# Patient Record
Sex: MALE | Race: WHITE | HISPANIC OR LATINO | Employment: FULL TIME | URBAN - METROPOLITAN AREA
[De-identification: names, ages, dates, MRNs, and addresses within clinical notes are randomized per-mention and may not be internally consistent; named-entity substitution may affect disease eponyms.]

---

## 2018-12-14 ENCOUNTER — TELEPHONE (OUTPATIENT)
Dept: URGENT CARE | Facility: CLINIC | Age: 33
End: 2018-12-14

## 2018-12-14 NOTE — TELEPHONE ENCOUNTER
Charted that I was giving Pt a Flu shot and hit given before calling them back to treatment room. When Pt came back he was ill with a cold and Dr. Lloyd stated it was not a good idea to do so. At this stage I couldn't delete or decline giving the shot. I contacted [facility] and I'm waiting on them to try, so that if Pt goes somewhere else he wont be blocked by his insurance.

## 2022-12-01 ENCOUNTER — OFFICE VISIT (OUTPATIENT)
Dept: FAMILY MEDICINE CLINIC | Facility: CLINIC | Age: 37
End: 2022-12-01

## 2022-12-01 VITALS
TEMPERATURE: 98 F | SYSTOLIC BLOOD PRESSURE: 138 MMHG | OXYGEN SATURATION: 98 % | WEIGHT: 185.5 LBS | DIASTOLIC BLOOD PRESSURE: 82 MMHG | HEIGHT: 63 IN | RESPIRATION RATE: 15 BRPM | HEART RATE: 76 BPM | BODY MASS INDEX: 32.87 KG/M2

## 2022-12-01 DIAGNOSIS — Z13.220 SCREENING FOR HYPERLIPIDEMIA: ICD-10-CM

## 2022-12-01 DIAGNOSIS — Z13.1 SCREENING FOR DIABETES MELLITUS: ICD-10-CM

## 2022-12-01 DIAGNOSIS — Z00.00 GENERAL MEDICAL EXAM: Primary | ICD-10-CM

## 2022-12-01 DIAGNOSIS — Z11.59 NEED FOR HEPATITIS C SCREENING TEST: ICD-10-CM

## 2022-12-01 PROCEDURE — 99385 PREV VISIT NEW AGE 18-39: CPT | Performed by: PHYSICIAN ASSISTANT

## 2022-12-01 PROCEDURE — 36415 COLL VENOUS BLD VENIPUNCTURE: CPT | Performed by: PHYSICIAN ASSISTANT

## 2022-12-01 NOTE — PATIENT INSTRUCTIONS
"Healthy Eating  Following a healthy eating pattern may help you to achieve and maintain a healthy body weight, reduce the risk of chronic disease, and live a long and productive life. It is important to follow a healthy eating pattern at an appropriate calorie level for your body. Your nutritional needs should be met primarily through food by choosing a variety of nutrient-rich foods.  What are tips for following this plan?  Reading food labels  Read labels and choose the following:  Reduced or low sodium.  Juices with 100% fruit juice.  Foods with low saturated fats and high polyunsaturated and monounsaturated fats.  Foods with whole grains, such as whole wheat, cracked wheat, brown rice, and wild rice.  Whole grains that are fortified with folic acid. This is recommended for women who are pregnant or who want to become pregnant.  Read labels and avoid the following:  Foods with a lot of added sugars. These include foods that contain brown sugar, corn sweetener, corn syrup, dextrose, fructose, glucose, high-fructose corn syrup, honey, invert sugar, lactose, malt syrup, maltose, molasses, raw sugar, sucrose, trehalose, or turbinado sugar.  Do not eat more than the following amounts of added sugar per day:  6 teaspoons (25 g) for women.  9 teaspoons (38 g) for men.  Foods that contain processed or refined starches and grains.  Refined grain products, such as white flour, degermed cornmeal, white bread, and white rice.  Shopping  Choose nutrient-rich snacks, such as vegetables, whole fruits, and nuts. Avoid high-calorie and high-sugar snacks, such as potato chips, fruit snacks, and candy.  Use oil-based dressings and spreads on foods instead of solid fats such as butter, stick margarine, or cream cheese.  Limit pre-made sauces, mixes, and \"instant\" products such as flavored rice, instant noodles, and ready-made pasta.  Try more plant-protein sources, such as tofu, tempeh, black beans, edamame, lentils, nuts, and " seeds.  Explore eating plans such as the Mediterranean diet or vegetarian diet.  Cooking  Use oil to sauté or stir-laureano foods instead of solid fats such as butter, stick margarine, or lard.  Try baking, boiling, grilling, or broiling instead of frying.  Remove the fatty part of meats before cooking.  Steam vegetables in water or broth.  Meal planning    At meals, imagine dividing your plate into fourths:  One-half of your plate is fruits and vegetables.  One-fourth of your plate is whole grains.  One-fourth of your plate is protein, especially lean meats, poultry, eggs, tofu, beans, or nuts.  Include low-fat dairy as part of your daily diet.     Lifestyle  Choose healthy options in all settings, including home, work, school, restaurants, or stores.  Prepare your food safely:  Wash your hands after handling raw meats.  Keep food preparation surfaces clean by regularly washing with hot, soapy water.  Keep raw meats separate from ready-to-eat foods, such as fruits and vegetables.  , meat, poultry, and eggs to the recommended internal temperature.  Store foods at safe temperatures. In general:  Keep cold foods at 40°F (4.4°C) or below.  Keep hot foods at 140°F (60°C) or above.  Keep your freezer at 0°F (-17.8°C) or below.  Foods are no longer safe to eat when they have been between the temperatures of 40°-140°F (4.4-60°C) for more than 2 hours.  What foods should I eat?  Fruits  Aim to eat 2 cup-equivalents of fresh, canned (in natural juice), or frozen fruits each day. Examples of 1 cup-equivalent of fruit include 1 small apple, 8 large strawberries, 1 cup canned fruit, ½ cup dried fruit, or 1 cup 100% juice.  Vegetables  Aim to eat 2½-3 cup-equivalents of fresh and frozen vegetables each day, including different varieties and colors. Examples of 1 cup-equivalent of vegetables include 2 medium carrots, 2 cups raw, leafy greens, 1 cup chopped vegetable (raw or cooked), or 1 medium baked potato.  Grains  Aim  to eat 6 ounce-equivalents of whole grains each day. Examples of 1 ounce-equivalent of grains include 1 slice of bread, 1 cup ready-to-eat cereal, 3 cups popcorn, or ½ cup cooked rice, pasta, or cereal.  Meats and other proteins  Aim to eat 5-6 ounce-equivalents of protein each day. Examples of 1 ounce-equivalent of protein include 1 egg, 1/2 cup nuts or seeds, or 1 tablespoon (16 g) peanut butter. A cut of meat or fish that is the size of a deck of cards is about 3-4 ounce-equivalents.  Of the protein you eat each week, try to have at least 8 ounces come from seafood. This includes salmon, trout, herring, and anchovies.  Dairy  Aim to eat 3 cup-equivalents of fat-free or low-fat dairy each day. Examples of 1 cup-equivalent of dairy include 1 cup (240 mL) milk, 8 ounces (250 g) yogurt, 1½ ounces (44 g) natural cheese, or 1 cup (240 mL) fortified soy milk.  Fats and oils  Aim for about 5 teaspoons (21 g) per day. Choose monounsaturated fats, such as canola and olive oils, avocados, peanut butter, and most nuts, or polyunsaturated fats, such as sunflower, corn, and soybean oils, walnuts, pine nuts, sesame seeds, sunflower seeds, and flaxseed.  Beverages  Aim for six 8-oz glasses of water per day. Limit coffee to three to five 8-oz cups per day.  Limit caffeinated beverages that have added calories, such as soda and energy drinks.  Limit alcohol intake to no more than 1 drink a day for nonpregnant women and 2 drinks a day for men. One drink equals 12 oz of beer (355 mL), 5 oz of wine (148 mL), or 1½ oz of hard liquor (44 mL).  Seasoning and other foods  Avoid adding excess amounts of salt to your foods. Try flavoring foods with herbs and spices instead of salt.  Avoid adding sugar to foods.  Try using oil-based dressings, sauces, and spreads instead of solid fats.  This information is based on general [place] nutrition guidelines. For more information, visit choosemyplate.gov. Exact amounts may vary based on your  nutrition needs.  Summary  A healthy eating plan may help you to maintain a healthy weight, reduce the risk of chronic diseases, and stay active throughout your life.  Plan your meals. Make sure you eat the right portions of a variety of nutrient-rich foods.  Try baking, boiling, grilling, or broiling instead of frying.  Choose healthy options in all settings, including home, work, school, restaurants, or stores.  This information is not intended to replace advice given to you by your health care provider. Make sure you discuss any questions you have with your health care provider.  Document Revised: 04/01/2019 Document Reviewed: 04/01/2019  Elsevier Patient Education © 2021 [employer] Inc.

## 2022-12-01 NOTE — PROGRESS NOTES
Annual Physical-Preventive Visit     Patient Name: Oswaldo Peralta  : 1985   MRN: [id number]     Chief Complaint:    Chief Complaint   Patient presents with   • Establish Care     Patient is here for a physical and labs        History of Present Illness: Oswaldo Peralta is a 36 y.o. male who is here today for their annual health maintenance and physical.  He has no complaints today.    Subjective      Review of Systems:   Review of Systems   Constitutional: Negative for fatigue and fever.   HENT: Negative for hearing loss.    Eyes: Negative for visual disturbance.   Respiratory: Negative for shortness of breath.    Cardiovascular: Negative for chest pain, palpitations and leg swelling.   Gastrointestinal: Negative for abdominal pain, blood in stool, constipation and diarrhea.   Genitourinary: Negative for difficulty urinating.   Musculoskeletal: Negative for arthralgias and myalgias.   Skin: Negative for rash.   Allergic/Immunologic: Negative for immunocompromised state.   Psychiatric/Behavioral: Negative for dysphoric mood. The patient is not nervous/anxious.         Past History:  Medical History: has no past medical history on file.   Surgical History: has no past surgical history on file.   Family History: family history is not on file.   Social History: reports that he has never smoked. He has never used smokeless tobacco. He reports that he does not drink alcohol and does not use drugs.    Health Maintenance   Topic Date Due   • ANNUAL PHYSICAL  Never done   • COVID-19 Vaccine (4 - Booster) 2022   • INFLUENZA VACCINE  Never done   • HEPATITIS C SCREENING  Never done   • TDAP/TD VACCINES (5 - Td or Tdap) 2032   • Pneumococcal Vaccine 0-64  Aged Out        Immunization History   Administered Date(s) Administered   • COVID-19 (PFIZER) PURPLE CAP 2021, 2021, 2022   • Hep B, Adolescent or Pediatric 10/13/1998, 1999   • MMR 1998, 1998   • OPV  "02/03/1998, 04/06/1998, 10/13/1998   • Td 02/03/1998, 04/06/1998, 10/13/1998   • Tdap 03/09/2022       Medications:   No current outpatient medications on file.    Allergies:   No Known Allergies    Depression: PHQ-2 Depression Screening  Little interest or pleasure in doing things?     Feeling down, depressed, or hopeless?     PHQ-2 Total Score        Predictive Model Details   No score data available for Risk of Fall         Objective     Physical Exam:  Vital Signs:   Vitals:    12/01/22 0928   BP: 138/82   BP Location: Right arm   Patient Position: Sitting   Cuff Size: Large Adult   Pulse: 76   Resp: 15   Temp: 98 °F (36.7 °C)   TempSrc: Temporal   SpO2: 98%   Weight: 84.1 kg (185 lb 8 oz)   Height: 160 cm (63\")     Body mass index is 32.86 kg/m².   BMI is >= 30 and <35. (Class 1 Obesity). The following options were offered after discussion;: weight loss educational material (shared in after visit summary)       Physical Exam  Constitutional:       Appearance: He is overweight.   Cardiovascular:      Rate and Rhythm: Normal rate and regular rhythm.   Pulmonary:      Effort: Pulmonary effort is normal.      Breath sounds: Normal breath sounds.   Neurological:      General: No focal deficit present.   Psychiatric:         Thought Content: Thought content normal.         Judgment: Judgment normal.         Procedures    Assessment / Plan      Assessment/Plan:   Diagnoses and all orders for this visit:    1. General medical exam (Primary)  -     Comprehensive Metabolic Panel  -     CBC & Differential  -     Vitamin B12  -     Folate  -     Lipid Panel  -     Hemoglobin A1c  -     TSH  -     T4, Free  -     CK  -     Hepatitis C Antibody    2. Screening for diabetes mellitus  -     Hemoglobin A1c    3. Screening for hyperlipidemia  -     Lipid Panel    4. Need for hepatitis C screening test  -     Hepatitis C Antibody           No current outpatient medications on file.    Follow Up:   No follow-ups on " file.    Healthcare Maintenance:   Counseling provided on healthy diet and exercise.  He said he is already gotten his influenza vaccine this year at an urgent clinic but we do not have record of this.  He has had 3 COVID vaccinations with the most recent being March 2022.  Oswaldo Peralta voices understanding and acceptance of this advice.  AVS with preventive healthcare tips printed for patient.     Loli Keane PA-C  [facility]

## 2022-12-02 LAB
ALBUMIN SERPL-MCNC: 4.6 G/DL (ref 4–5)
ALBUMIN/GLOB SERPL: 1.8 {RATIO} (ref 1.2–2.2)
ALP SERPL-CCNC: 96 IU/L (ref 44–121)
ALT SERPL-CCNC: 30 IU/L (ref 0–44)
AST SERPL-CCNC: 21 IU/L (ref 0–40)
BASOPHILS # BLD AUTO: 0.1 X10E3/UL (ref 0–0.2)
BASOPHILS NFR BLD AUTO: 1 %
BILIRUB SERPL-MCNC: 0.7 MG/DL (ref 0–1.2)
BUN SERPL-MCNC: 10 MG/DL (ref 6–20)
BUN/CREAT SERPL: 15 (ref 9–20)
CALCIUM SERPL-MCNC: 9.5 MG/DL (ref 8.7–10.2)
CHLORIDE SERPL-SCNC: 105 MMOL/L (ref 96–106)
CHOLEST SERPL-MCNC: 164 MG/DL (ref 100–199)
CK SERPL-CCNC: 70 U/L (ref 49–439)
CO2 SERPL-SCNC: 22 MMOL/L (ref 20–29)
CREAT SERPL-MCNC: 0.65 MG/DL (ref 0.76–1.27)
EGFRCR SERPLBLD CKD-EPI 2021: 125 ML/MIN/1.73
EOSINOPHIL # BLD AUTO: 0.8 X10E3/UL (ref 0–0.4)
EOSINOPHIL NFR BLD AUTO: 8 %
ERYTHROCYTE [DISTWIDTH] IN BLOOD BY AUTOMATED COUNT: 12.4 % (ref 11.6–15.4)
FOLATE SERPL-MCNC: 9.1 NG/ML
GLOBULIN SER CALC-MCNC: 2.5 G/DL (ref 1.5–4.5)
GLUCOSE SERPL-MCNC: 112 MG/DL (ref 70–99)
HBA1C MFR BLD: 5.3 % (ref 4.8–5.6)
HCT VFR BLD AUTO: 45.4 % (ref 37.5–51)
HCV AB S/CO SERPL IA: 0.2 S/CO RATIO (ref 0–0.9)
HDLC SERPL-MCNC: 41 MG/DL
HGB BLD-MCNC: 15.8 G/DL (ref 13–17.7)
IMM GRANULOCYTES # BLD AUTO: 0.1 X10E3/UL (ref 0–0.1)
IMM GRANULOCYTES NFR BLD AUTO: 1 %
LDLC SERPL CALC-MCNC: 104 MG/DL (ref 0–99)
LYMPHOCYTES # BLD AUTO: 2.4 X10E3/UL (ref 0.7–3.1)
LYMPHOCYTES NFR BLD AUTO: 24 %
MCH RBC QN AUTO: 30.9 PG (ref 26.6–33)
MCHC RBC AUTO-ENTMCNC: 34.8 G/DL (ref 31.5–35.7)
MCV RBC AUTO: 89 FL (ref 79–97)
MONOCYTES # BLD AUTO: 0.8 X10E3/UL (ref 0.1–0.9)
MONOCYTES NFR BLD AUTO: 7 %
NEUTROPHILS # BLD AUTO: 6.3 X10E3/UL (ref 1.4–7)
NEUTROPHILS NFR BLD AUTO: 59 %
PLATELET # BLD AUTO: 330 X10E3/UL (ref 150–450)
POTASSIUM SERPL-SCNC: 4.7 MMOL/L (ref 3.5–5.2)
PROT SERPL-MCNC: 7.1 G/DL (ref 6–8.5)
RBC # BLD AUTO: 5.11 X10E6/UL (ref 4.14–5.8)
SODIUM SERPL-SCNC: 139 MMOL/L (ref 134–144)
T4 FREE SERPL-MCNC: 1.01 NG/DL (ref 0.82–1.77)
TRIGL SERPL-MCNC: 102 MG/DL (ref 0–149)
TSH SERPL DL<=0.005 MIU/L-ACNC: 1.14 UIU/ML (ref 0.45–4.5)
VIT B12 SERPL-MCNC: 1029 PG/ML (ref 232–1245)
VLDLC SERPL CALC-MCNC: 19 MG/DL (ref 5–40)
WBC # BLD AUTO: 10.4 X10E3/UL (ref 3.4–10.8)

## 2024-09-30 ENCOUNTER — OFFICE VISIT (OUTPATIENT)
Dept: FAMILY MEDICINE CLINIC | Facility: CLINIC | Age: 39
End: 2024-09-30
Payer: COMMERCIAL

## 2024-09-30 VITALS
SYSTOLIC BLOOD PRESSURE: 110 MMHG | HEART RATE: 79 BPM | HEIGHT: 63 IN | DIASTOLIC BLOOD PRESSURE: 74 MMHG | BODY MASS INDEX: 34.2 KG/M2 | WEIGHT: 193 LBS | OXYGEN SATURATION: 96 %

## 2024-09-30 DIAGNOSIS — R07.9 CHEST PAIN, UNSPECIFIED TYPE: Primary | ICD-10-CM

## 2024-09-30 PROCEDURE — 93000 ELECTROCARDIOGRAM COMPLETE: CPT | Performed by: PHYSICIAN ASSISTANT

## 2024-09-30 PROCEDURE — 99213 OFFICE O/P EST LOW 20 MIN: CPT | Performed by: PHYSICIAN ASSISTANT

## 2024-09-30 NOTE — PROGRESS NOTES
"      Patient Office Visit      Patient Name: Oswaldo Garcia  : 1985   MRN: [id number]     Chief Complaint:    Chief Complaint   Patient presents with    Chest Pain       History of Present Illness: Oswaldo Garcia is a 38 y.o. male who is here today with his wife wanting referral to cardiology.  He has had 2 episodes of chest pain over the last 3 to 4 weeks each lasting 3 to 4 hours.  Second episode sent him to the emergency department at [facility].  We were not able to access his ER visit records but wife says they did not tell them anything about why he was having chest pain.  They did do blood work and an EKG.  Chest pain is not worse with exertion and no associated shortness of breath.  He said he was able to drink a Pepsi and belch getting some relief so he thinks he had some gas trapped in his stomach but wants cardiology evaluation to be on the safe side.    Subjective      Review of Systems:         Past Medical History: History reviewed. No pertinent past medical history.    Past Surgical History: No past surgical history on file.    Family History: History reviewed. No pertinent family history.    Social History:   Social History     Socioeconomic History    Marital status: Single   Tobacco Use    Smoking status: Never    Smokeless tobacco: Never   Vaping Use    Vaping status: Never Used   Substance and Sexual Activity    Alcohol use: Never    Drug use: Never    Sexual activity: Defer       Allergies:   No Known Allergies    Objective     Physical Exam:  Vital Signs:   Vitals:    24 0830   BP: 110/74   BP Location: Left arm   Patient Position: Sitting   Cuff Size: Large Adult   Pulse: 79   SpO2: 96%   Weight: 87.5 kg (193 lb)   Height: 160 cm (63\")     Body mass index is 34.19 kg/m².        Physical Exam  Constitutional:       Appearance: Normal appearance.   Cardiovascular:      Rate and Rhythm: Normal rate and regular rhythm.   Pulmonary:      Effort: " Pulmonary effort is normal.      Breath sounds: Normal breath sounds.   Musculoskeletal:      Cervical back: Normal range of motion and neck supple.   Neurological:      Mental Status: He is alert and oriented to person, place, and time.   Psychiatric:         Mood and Affect: Mood normal.         Behavior: Behavior normal.         Thought Content: Thought content normal.         Judgment: Judgment normal.           ECG 12 Lead    Date/Time: 9/30/2024 12:01 PM  Performed by: Loli Keane PA    Authorized by: Loli Keane PA  Comparison: not compared with previous ECG   Previous ECG: no previous ECG available  Rhythm: sinus rhythm  Rate: normal  BPM: 63  Conduction: non-specific intraventricular conduction delay  QRS axis: left  Other findings: non-specific ST-T wave changes    Clinical impression: non-specific ECG          Assessment / Plan      Assessment/Plan:   Diagnoses and all orders for this visit:    1. Chest pain, unspecified type (Primary)  Assessment & Plan:  Most likely due to acid reflux, offered to start medication.  They want to see cardiology first.  I recommend schedule follow-up here after cardiology workup complete.    Orders:  -     Ambulatory Referral to Cardiology  -     ECG 12 Lead             Medications:   No current outpatient medications on file.        Follow Up:   No follow-ups on file.    Loli Keane PA-C   [facility]     NOTE TO PATIENT: The 21st Century Cures Act makes medical notes like these available to patients in the interest of transparency. However, be advised this is a medical document. It is intended as peer to peer communication. It is written in medical language and may contain abbreviations or verbiage that are unfamiliar. It may appear blunt or direct. Medical documents are intended to carry relevant information, facts as evident, and the clinical opinion of the practitioner.

## 2024-09-30 NOTE — ASSESSMENT & PLAN NOTE
Most likely due to acid reflux, offered to start medication.  They want to see cardiology first.  I recommend schedule follow-up here after cardiology workup complete.

## 2024-10-09 ENCOUNTER — OFFICE VISIT (OUTPATIENT)
Dept: CARDIOLOGY | Facility: CLINIC | Age: 39
End: 2024-10-09
Payer: COMMERCIAL

## 2024-10-09 VITALS
BODY MASS INDEX: 34.3 KG/M2 | SYSTOLIC BLOOD PRESSURE: 142 MMHG | OXYGEN SATURATION: 98 % | RESPIRATION RATE: 18 BRPM | HEART RATE: 75 BPM | DIASTOLIC BLOOD PRESSURE: 78 MMHG | WEIGHT: 193.6 LBS | HEIGHT: 63 IN

## 2024-10-09 DIAGNOSIS — R03.0 BORDERLINE HYPERTENSION: ICD-10-CM

## 2024-10-09 DIAGNOSIS — E78.00 HYPERCHOLESTEROLEMIA: ICD-10-CM

## 2024-10-09 DIAGNOSIS — R07.9 CHEST PAIN, UNSPECIFIED TYPE: Primary | ICD-10-CM

## 2024-10-09 PROCEDURE — 99204 OFFICE O/P NEW MOD 45 MIN: CPT | Performed by: INTERNAL MEDICINE

## 2024-10-09 NOTE — PATIENT INSTRUCTIONS
[facility]  [facility]OLOG[facility]  [address] [place] [address]  Dept: [phone]  Dept Fax: [phone]    Date:   Patient: Oswaldo Garcia    Blood Pressure Log  Provided By: Branden Sandoval MD    Date Blood Pressure Heart Rate Comments   Thursday October 10, 2024       Friday October 11, 2024       Saturday October 12, 2024       Berry October 13, 2024       Monday October 14, 2024       Tuesday October 15, 2024       Wednesday October 16, 2024       Thursday October 17, 2024       Friday October 18, 2024       Saturday October 19, 2024       Berry October 20, 2024       Monday October 21, 2024       Tuesday October 22, 2024       Wednesday October 23, 2024       Thursday October 24, 2024       Friday October 25, 2024       Saturday October 26, 2024       Berry October 27, 2024       Monday October 28, 2024       Tuesday October 29, 2024       Wednesday October 30, 2024       Thursday October 31, 2024       Friday November 1, 2024       Saturday November 2, 2024       Berry November 3, 2024       Monday November 4, 2024       Tuesday November 5, 2024       Wednesday November 6, 2024       Thursday November 7, 2024       Friday November 8, 2024       Saturday November 9, 2024       Berry November 10, 2024       Monday November 11, 2024       Tuesday November 12, 2024       Wednesday November 13, 2024       Thursday November 14, 2024       Friday November 15, 2024       Saturday November 16, 2024       Berry November 17, 2024       Monday November 18, 2024       Tuesday November 19, 2024       Wednesday November 20, 2024       Thursday November 21, 2024       Friday November 22, 2024       Saturday November 23, 2024       Berry November 24, 2024       Monday November 25, 2024       Tuesday November 26, 2024       Wednesday November 27, 2024       Thursday November 28, 2024       Friday November 29, 2024       Saturday November 30, 2024       Berry December 1, 2024        Monday December 2, 2024       Tuesday December 3, 2024       Wednesday December 4, 2024       Thursday December 5, 2024       Friday December 6, 2024       Saturday December 7, 2024

## 2024-10-09 NOTE — ASSESSMENT & PLAN NOTE
Atypical chest pain.  Could have been a muscle cramp in the setting of dehydration and heavy lifting at work.  No clinical angina.  No limitation to exercise.  Exam normal.  EKG with nonspecific T wave normality.  Plan:  Treadmill stress testing for reassurance purposes  Risk factors modification: Patient counseled in regards to heart healthy, low fat/ low cholesterol/low sat diet, daily exercise for 30 minutes, low to moderate intensity, and weight loss.

## 2024-10-09 NOTE — PROGRESS NOTES
"[facility] CARD DO  [facility] CARDIOLOGY  [address] [place] [address]  Dept: [phone]  Dept Fax: [phone]    Date: 10/09/2024  Patient: Oswaldo Garcia  YOB: 1985    New Patient Office Note    Consult Reason:  Mr. Oswaldo Garcia is a 38 y.o. male who presents to the clinic to establish care, seen for Chest Pain.   Patient had an episode of chest pain 3 weeks ago, at work, seen prior ER visits.  Workup in the ED negative.  Patient remembers going to work fasting and doing some heavy lifting.  Patient denies exertional angina, orthopnea, PND, palpitations, lightheadedness, syncope or medications side-effects.  Patient denies smoking, alcohol, illicits.    The following portions of the patient's history were reviewed and updated as appropriate: allergies, current medications, past family history, past medical history, past social history, past surgical history, and problem list.    Medications: No Known Allergies   No current outpatient medications    Subjective  History reviewed. No pertinent past medical history.    History reviewed. No pertinent surgical history.    History reviewed. No pertinent family history.     Social History     Socioeconomic History    Marital status: Single   Tobacco Use    Smoking status: Never    Smokeless tobacco: Never   Vaping Use    Vaping status: Never Used   Substance and Sexual Activity    Alcohol use: Never    Drug use: Never    Sexual activity: Defer       Objective  Vitals:    10/09/24 0949   BP: 142/78   Pulse: 75   Resp: 18   SpO2: 98%   Weight: 87.8 kg (193 lb 9.6 oz)   Height: 160 cm (63\")   PainSc: 0-No pain     Vitals:    10/09/24 0949   BP: 142/78   Pulse: 75   Resp: 18   SpO2: 98%   Weight: 87.8 kg (193 lb 9.6 oz)   Height: 160 cm (63\")        Physical Exam  Constitutional:       Appearance: Healthy appearance. Not in distress.   Eyes:      Pupils: Pupils are equal, round, and reactive to light.   HENT:    " "Mouth/Throat:      Mouth: Mucous membranes are moist.   Neck:      Vascular: No carotid bruit, hepatojugular reflux, JVD or JVR. JVD normal.   Pulmonary:      Effort: Pulmonary effort is normal.      Breath sounds: Normal breath sounds. No wheezing. No rhonchi. No rales.   Chest:      Chest wall: Not tender to palpatation.   Cardiovascular:      PMI at left midclavicular line. Normal rate. Regular rhythm. Normal S1 with normal intensity. Normal S2 with normal intensity.       Murmurs: There is no murmur.      No gallop.  No click. No rub.   Pulses:     Intact distal pulses.   Edema:     Peripheral edema absent.   Abdominal:      General: There is no abdominal bruit.   Skin:     General: Skin is warm.   Neurological:      Mental Status: Alert and oriented to person, place and time.              Labs:  Lab Results   Component Value Date     2022    K 4.7 2022     2022    CO2 22 2022    BUN 10 2022    CREATININE 0.65 (L) 2022    CALCIUM 9.5 2022    BILITOT 0.7 2022    ALKPHOS 96 2022    ALT 30 2022    AST 21 2022    GLUCOSE 112 (H) 2022    ALBUMIN 4.6 2022     Lab Results   Component Value Date    WBC 10.4 2022    HGB 15.8 2022    HCT 45.4 2022     2022     No results found for: \"APTT\", \"INR\", \"PTT\"  Lab Results   Component Value Date    CKTOTAL 70 2022     No results found for: \"BNP\", \"PROBNP\"    Lab Results   Component Value Date    CHLPL 164 2022    TRIG 102 2022    HDL 41 2022     (H) 2022     Lab Results   Component Value Date    TSH 1.140 2022    FREET4 1.01 2022       The ASCVD Risk score (Dong DK, et al., 2019) failed to calculate for the following reasons:    The 2019 ASCVD risk score is only valid for ages 40 to 79     CV Diagnostics:  Procedures  EK2024: normal sinus rhythm, nonspecific T wave changes.  CXR: No results found for " this or any previous visit.     ECHO/MUGA: No results found for this or any previous visit.     STRESS TESTS: No results found for this or any previous visit.     CARDIAC CATH: No results found for this or any previous visit.     DEVICES: No valid procedures specified.   HOLTER: No results found for this or any previous visit.     CT/MRI:  No results found for this or any previous visit.    VASCULAR: No valid procedures specified.     Assessment and Plan  Diagnoses and all orders for this visit:    1. Chest pain, unspecified type (Primary)  Assessment & Plan:  Atypical chest pain.  Could have been a muscle cramp in the setting of dehydration and heavy lifting at work.  No clinical angina.  No limitation to exercise.  Exam normal.  EKG with nonspecific T wave normality.  Plan:  Treadmill stress testing for reassurance purposes  Risk factors modification: Patient counseled in regards to heart healthy, low fat/ low cholesterol/low sat diet, daily exercise for 30 minutes, low to moderate intensity, and weight loss.    Orders:  -     Treadmill Stress Test; Future    2. Hypercholesterolemia  Assessment & Plan:   Lipid abnormalities are newly identified    Plan:  Lipid lowering therapy not prescribed due to age, trial of TLC.     Discussed medication dosage, use, side effects, and goals of treatment in detail.    Counseled patient on lifestyle modifications to help control hyperlipidemia.   Cholesterol lowering dietary information shared with patient.  Advised patient to exercise for 150 minutes weekly. (30 minute brisk walk, 5 days a week for example)  Weight Loss encouraged    Patient Treatment Goals:   LDL goal is under 100    Followup in 6 months.      3. Borderline hypertension  Assessment & Plan:  Discussed with patient blood pressure goal less than 130/80.  Room to improve and lifestyle.   Plan:  Patient counseled in regards to heart healthy, low fat/ low cholesterol/low sat diet, daily exercise for 30 minutes, low  to moderate intensity, and weight loss.  Patient to monitor his blood pressure at home as explained  Follow-up blood pressure readings next visit and consider medical therapy if not at goal           Return in about 6 months (around 4/9/2025) for Follow-up with Dr Sandoval.    Patient Instructions   MGE CARD DO  [facility] CARDIOLOGY  [address] [place] [address]  Dept: [phone]  Dept Fax: [phone]    Date:   Patient: Oswaldo Garcia    Blood Pressure Log  Provided By: Branden Sandoval MD    Date Blood Pressure Heart Rate Comments   Thursday October 10, 2024       Friday October 11, 2024       Saturday October 12, 2024       Berry October 13, 2024       Monday October 14, 2024       Tuesday October 15, 2024       Wednesday October 16, 2024       Thursday October 17, 2024       Friday October 18, 2024       Saturday October 19, 2024       Berry October 20, 2024       Monday October 21, 2024       Tuesday October 22, 2024       Wednesday October 23, 2024       Thursday October 24, 2024       Friday October 25, 2024       Saturday October 26, 2024       Berry October 27, 2024       Monday October 28, 2024       Tuesday October 29, 2024       Wednesday October 30, 2024       Thursday October 31, 2024       Friday November 1, 2024       Saturday November 2, 2024       Berry November 3, 2024       Monday November 4, 2024       Tuesday November 5, 2024       Wednesday November 6, 2024       Thursday November 7, 2024       Friday November 8, 2024       Saturday November 9, 2024       Berry November 10, 2024       Monday November 11, 2024       Tuesday November 12, 2024       Wednesday November 13, 2024       Thursday November 14, 2024       Friday November 15, 2024       Saturday November 16, 2024       Berry November 17, 2024       Monday November 18, 2024       Tuesday November 19, 2024       Wednesday November 20, 2024       Thursday November 21, 2024       Friday  November 22, 2024       Saturday November 23, 2024       Berry November 24, 2024       Monday November 25, 2024       Tuesday November 26, 2024       Wednesday November 27, 2024       Thursday November 28, 2024       Friday November 29, 2024       Saturday November 30, 2024       Berry December 1, 2024       Monday December 2, 2024       Tuesday December 3, 2024       Wednesday December 4, 2024       Thursday December 5, 2024       Friday December 6, 2024       Saturday December 7, 2024            Brnaden Sandoval MD

## 2024-10-09 NOTE — ASSESSMENT & PLAN NOTE
Discussed with patient blood pressure goal less than 130/80.  Room to improve and lifestyle.   Plan:  Patient counseled in regards to heart healthy, low fat/ low cholesterol/low sat diet, daily exercise for 30 minutes, low to moderate intensity, and weight loss.  Patient to monitor his blood pressure at home as explained  Follow-up blood pressure readings next visit and consider medical therapy if not at goal

## 2024-10-09 NOTE — ASSESSMENT & PLAN NOTE
Lipid abnormalities are newly identified    Plan:  Lipid lowering therapy not prescribed due to age, trial of TLC.     Discussed medication dosage, use, side effects, and goals of treatment in detail.    Counseled patient on lifestyle modifications to help control hyperlipidemia.   Cholesterol lowering dietary information shared with patient.  Advised patient to exercise for 150 minutes weekly. (30 minute brisk walk, 5 days a week for example)  Weight Loss encouraged    Patient Treatment Goals:   LDL goal is under 100    Followup in 6 months.

## 2024-11-12 ENCOUNTER — TELEPHONE (OUTPATIENT)
Dept: CARDIOLOGY | Facility: CLINIC | Age: 39
End: 2024-11-12
Payer: COMMERCIAL

## 2024-11-12 NOTE — TELEPHONE ENCOUNTER
----- Message from Branden Sandoval sent at 11/11/2024  9:09 PM EST -----  Please inform patient that his treadmill Stress Test was normal.   Thank you!

## 2024-11-12 NOTE — TELEPHONE ENCOUNTER
Call placed to patient to discuss stress test results. No answer, left voicemail requesting call back. OK for Hub to relay-Please inform patient that his treadmill Stress Test was normal.